# Patient Record
Sex: FEMALE | Race: ASIAN | URBAN - METROPOLITAN AREA
[De-identification: names, ages, dates, MRNs, and addresses within clinical notes are randomized per-mention and may not be internally consistent; named-entity substitution may affect disease eponyms.]

---

## 2023-09-11 ENCOUNTER — APPOINTMENT (RX ONLY)
Dept: URBAN - METROPOLITAN AREA CLINIC 41 | Facility: CLINIC | Age: 33
Setting detail: DERMATOLOGY
End: 2023-09-11

## 2023-09-11 DIAGNOSIS — D69.0 ALLERGIC PURPURA: ICD-10-CM | Status: INADEQUATELY CONTROLLED

## 2023-09-11 PROBLEM — L30.9 DERMATITIS, UNSPECIFIED: Status: ACTIVE | Noted: 2023-09-11

## 2023-09-11 PROCEDURE — 99204 OFFICE O/P NEW MOD 45 MIN: CPT

## 2023-09-11 PROCEDURE — ? ORDER TESTS

## 2023-09-11 PROCEDURE — ? COUNSELING

## 2023-09-11 PROCEDURE — ? PRESCRIPTION MEDICATION MANAGEMENT

## 2023-09-11 PROCEDURE — ? PRESCRIPTION

## 2023-09-11 RX ORDER — TRIAMCINOLONE ACETONIDE 1 MG/G
CREAM TOPICAL BID
Qty: 80 | Refills: 3 | Status: ERX | COMMUNITY
Start: 2023-09-11

## 2023-09-11 RX ADMIN — TRIAMCINOLONE ACETONIDE: 1 CREAM TOPICAL at 00:00

## 2023-09-11 ASSESSMENT — LOCATION SIMPLE DESCRIPTION DERM
LOCATION SIMPLE: LEFT THIGH
LOCATION SIMPLE: RIGHT THIGH

## 2023-09-11 ASSESSMENT — LOCATION DETAILED DESCRIPTION DERM
LOCATION DETAILED: LEFT ANTERIOR PROXIMAL THIGH
LOCATION DETAILED: RIGHT ANTERIOR PROXIMAL THIGH

## 2023-09-11 ASSESSMENT — LOCATION ZONE DERM: LOCATION ZONE: LEG

## 2023-09-11 NOTE — PROCEDURE: PRESCRIPTION MEDICATION MANAGEMENT
Plan: Consider biopsy if no improvement
Detail Level: Zone
Initiate Treatment: Triamcinalone .1% cream BID on damp skin
Render In Strict Bullet Format?: No

## 2023-09-11 NOTE — PROCEDURE: COUNSELING
Detail Level: Detailed
Patient Specific Counseling (Will Not Stick From Patient To Patient): -\\nPt showed LC photo of rash after being exposed to heat- shows a lighter ring around the rash. LC favors potential urticaria or Urticarial vasculitis.\\n\\nLC counsels Pt that if doesn’t resolve in 2 weeks, they will biopsy and do BW. LC advises that she doesn’t favor a Lyme disease diagnosis as pt hasn’t been bitten by a tick to her knowledge. LC advises pt that they can do BW sooner to see.\\n\\nPatient notes no new medications and does not taking any medications regularly. Denies fevers, chills and joint pains. Notes it started as one area and spread on the lower legs. Patient asks about pityriasis rosea, LC does not favor this. RTO sooner or seek medical attention if worsening S/S

## 2023-09-11 NOTE — HPI: RASH (ECZEMA)
How Severe Is Your Eczema?: moderate
Is This A New Presentation, Or A Follow-Up?: Rash
Additional History: Started as 1 patch and others appeared \\nUsed OTC Hydrocortisone cream and Cerave lotion, didn’t help

## 2023-09-11 NOTE — PROCEDURE: ORDER TESTS
Billing Type: Third-Party Bill
Performing Laboratory: 0
Bill For Surgical Tray: no
Expected Date Of Service: 09/11/2023